# Patient Record
Sex: MALE | Race: WHITE | NOT HISPANIC OR LATINO | Employment: FULL TIME | URBAN - METROPOLITAN AREA
[De-identification: names, ages, dates, MRNs, and addresses within clinical notes are randomized per-mention and may not be internally consistent; named-entity substitution may affect disease eponyms.]

---

## 2024-09-11 ENCOUNTER — TRANSFERRED RECORDS (OUTPATIENT)
Dept: HEALTH INFORMATION MANAGEMENT | Facility: CLINIC | Age: 34
End: 2024-09-11

## 2024-09-17 ENCOUNTER — MEDICAL CORRESPONDENCE (OUTPATIENT)
Dept: HEALTH INFORMATION MANAGEMENT | Facility: CLINIC | Age: 34
End: 2024-09-17

## 2024-09-20 ENCOUNTER — TRANSCRIBE ORDERS (OUTPATIENT)
Dept: OTHER | Age: 34
End: 2024-09-20

## 2024-09-20 DIAGNOSIS — S89.91XA INJURY OF RIGHT KNEE: ICD-10-CM

## 2024-09-20 DIAGNOSIS — M23.91 INTERNAL DERANGEMENT OF KNEE, RIGHT: Primary | ICD-10-CM

## 2024-09-25 ENCOUNTER — TELEPHONE (OUTPATIENT)
Dept: ORTHOPEDICS | Facility: CLINIC | Age: 34
End: 2024-09-25
Payer: COMMERCIAL

## 2024-09-25 NOTE — TELEPHONE ENCOUNTER
Other: Pt is being referred to see a surgeon for multi ligament injury ACL,PCL,PLC. MRI and xray done @ [facility] in [place], [place]. Please review and advise     Could we send this information to you [facility] or would you prefer to receive a phone call?:   Patient would prefer a phone call   Okay to leave a detailed message?: Yes at Cell number on file:    Telephone Information:   Mobile [phone]

## 2024-09-25 NOTE — TELEPHONE ENCOUNTER
Action September 25, 2024 3:11 PM MT   Action Taken Sent a request for records from [facility].  Called [facility] for MRI to be pushed STAT, left a voicemail.  Called [facility] for imaging to be pushed STAT, left a voicemail.     Action September 27, 2024 3:26 PM MT   Action Taken Called [facility] radiology, tt: Con for imaging to be pushed     Action September 30, 2024 8:45 AM MT   Action Taken Called [facility] radiology, no answer, left a voicemail for their team again.      Action September 30, 2024 9:52 AM MT   Action Taken Called  [facility], tt: rep Emelina states that it shows completed on their end that the image was pushed, will call back if it is still not seen.     DIAGNOSIS:   Internal derangement of knee, right [M23.91]  - Primary  Injury of right knee [S89.91XA]   APPOINTMENT DATE: 09/30/2024   NOTES STATUS DETAILS   OFFICE NOTE from referring provider Care Everywhere Michael Moore MD - [facility]   OFFICE NOTE from other specialist Care Everywhere 09/06/2024 - Kay Boswell, APRN-CNP - [facility]   DISCHARGE REPORT from the ER Care Everywhere 09/05/2024  [facility]   MRI PACS [facility]:  09/11/2024 - RT Knee   XRAYS (IMAGES & REPORTS) In process [facility]:  09/05/2024 - RT Tib-Fib

## 2024-09-30 ENCOUNTER — ANCILLARY PROCEDURE (OUTPATIENT)
Dept: GENERAL RADIOLOGY | Facility: CLINIC | Age: 34
End: 2024-09-30
Attending: ORTHOPAEDIC SURGERY
Payer: OTHER MISCELLANEOUS

## 2024-09-30 ENCOUNTER — MEDICAL CORRESPONDENCE (OUTPATIENT)
Dept: HEALTH INFORMATION MANAGEMENT | Facility: CLINIC | Age: 34
End: 2024-09-30
Payer: COMMERCIAL

## 2024-09-30 ENCOUNTER — OFFICE VISIT (OUTPATIENT)
Dept: ORTHOPEDICS | Facility: CLINIC | Age: 34
End: 2024-09-30
Payer: OTHER MISCELLANEOUS

## 2024-09-30 ENCOUNTER — TELEPHONE (OUTPATIENT)
Dept: ORTHOPEDICS | Facility: CLINIC | Age: 34
End: 2024-09-30
Payer: COMMERCIAL

## 2024-09-30 ENCOUNTER — PRE VISIT (OUTPATIENT)
Dept: ORTHOPEDICS | Facility: CLINIC | Age: 34
End: 2024-09-30
Payer: COMMERCIAL

## 2024-09-30 VITALS — HEIGHT: 76 IN | BODY MASS INDEX: 25.57 KG/M2 | WEIGHT: 210 LBS

## 2024-09-30 DIAGNOSIS — M25.561 RIGHT KNEE PAIN: Primary | ICD-10-CM

## 2024-09-30 DIAGNOSIS — M25.561 ACUTE PAIN OF RIGHT KNEE: Primary | ICD-10-CM

## 2024-09-30 DIAGNOSIS — M25.561 RIGHT KNEE PAIN: ICD-10-CM

## 2024-09-30 PROCEDURE — 99204 OFFICE O/P NEW MOD 45 MIN: CPT | Mod: GC | Performed by: ORTHOPAEDIC SURGERY

## 2024-09-30 PROCEDURE — 73560 X-RAY EXAM OF KNEE 1 OR 2: CPT | Mod: RT | Performed by: RADIOLOGY

## 2024-09-30 NOTE — LETTER
9/30/2024      Tor Caballero  [address] [place] [address]      Dear Colleague,    Thank you for referring your patient, Tor Caballero, to the [facility]. Please see a copy of my visit note below.    CHIEF CONCERN: Right multi ligament knee injury     HISTORY:   This is a 33 year old male with a history of right upper extremity thoracic outlet syndrome and remote history of RUE DVT who presents for evaluation of his right knee. 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  States he has been in a knee immobilizer and using crutches for ambulation.  He reports diminished sensation over the dorsum of the right foot.  In regards to his right upper extremity he states he had a DVT related to thoracic outlet syndrome when he was in high school and was on period of anticoagulation for 3 to 4 months but has not been on anticoagulation since and has had no recurrent issues.  He is very physical at his job and it involves lifting, climbing and field work.    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 Articulate and Interactive    Body mass index is 25.56 kg/m .    Right lower extremity :  Skin is Warm and Well perfused, no suggestion of infection  Edematous around the right knee with mild effusion  Range of motion approximately 25 to 65 degrees  Exam limited by guarding and stiffness.  He does have increased translation in the sagittal plane.  Motor function 4 out of 5 with plantar and dorsiflexion, inversion and eversion.  He is lacking motor function to the EHL.    Decreased sensation in the deep and superficial peroneal nerve distributions  Palpable PT pulse    IMAGING:    Radiographs of the right knee from 9/5/2024 were independently reviewed by me and findings were discussed with the patient today. The imaging demonstrates maintained alignment.  Apparent fracture of the fibular head with retraction to the joint..    MRI of the right knee from 9/13/2024 were independently reviewed by me and findings were discussed with the patient today. The imaging demonstrates disruption of the ACL, PCL, posterolateral corner with fibular head fracture, likely radial tear of the lateral meniscus mid body and possible tearing of the anterior horn of the medial meniscus.    ASSESSMENT:  Right multiligament knee injury    PLAN:  We had a long discussion with Tor and his wife who was present today.  We recommended surgical intervention consisting of ACL reconstruction, PCL reconstruction, posterolateral corner reconstruction, possible medial and lateral meniscal repairs.  We discussed the expected postoperative course.  We did discuss that he has some altered function of his peroneal nerve.  We discussed that this would likely improve although this can be unpredictable and may not fully recover or potentially could even worsen after surgery.  All of their questions were answered and we will look at a time to schedule surgery. We will plan on post-operative DVT prophylaxis with aspirin.      Prince Rodriguez MD  Sports Medicine and Shoulder Fellow         Patient seen and examined with the fellow. I also personally reviewed the images and interpreted the imaging myself.     Assesment: Grade 3 rupture the anterior cruciate ligament, grade 3 rupture of the posterior cruciate ligament, grade 3 rupture of the posterior lateral corner with no EHL function and decreased tibialis anterior function    Plan: I had a long discussion with the patient.  Reviewed the diagnosis potential treatment options.  I told the patient this was a life-changing injury and his knee would never be the same as it was before.    Given his globally unstable knee he certainly needs surgical reconstruction.  The surgical plan will be examination under anesthesia right knee, right knee arthroscopy, anterior cruciate ligament reconstruction with quadricep tendon autograft, posterior cruciate ligament reconstruction with allograft, posterior lateral corner repair and reconstruction, peroneal nerve neurolysis, meniscus surgery.    I discussed with the patient the pros cons risk benefits of surgery versus not surgery specifically the 20% risk of arthrofibrosis.  We discussed the limitations of surgery need for further intervention.  Will look for time to schedule this can be completed.    I agree with history, physical and imaging as well as the assessment and plan as detailed by Dr. Rodriguez.       Again, thank you for allowing me to participate in the care of your patient.        Sincerely,        Jj Merritt MD

## 2024-09-30 NOTE — TELEPHONE ENCOUNTER
Procedure: ACL and PCL reconstruction. Posterior lateral corner reconstruction. Possible lateral and medical meniscus repair  Facility: [facility]  Length: 180-240 minutes  Anesthesia: Choice  Post-op appointments needed: 2 weeks provider only, 6 weeks with provider only.  Surgery packet/instructions given to patient?  Yes     Pre-Operative Teaching Flowsheet     Person(s) involved in teaching: Patient and Wife     Motivation Level:  Receptive (willing/able to accept information) and asks appropriate questions where applicable: Yes  Any cultural factors/Congregational beliefs that may influence understanding or compliance? No     Patient and Family demonstrates understanding of the following:  Pre-operative planning, including the necessary appointments and preparation needed prior to surgery: Yes  Which situations necessitate calling provider and whom to contact: Yes  Pain management techniques pre and post op: Yes  How, and when, to access community resources: Yes    Who will drive and stay/ with patient after surgery: Wife  Pre-op exam [facility]  PT to be completed at [facility]         Additional Teaching Concerns Addressed:   Post-operative living arrangements and necessary adaptations to living environment.     Instructional Materials Used/Given: Yes, pre-op packet given including forms for Your surgery day, preparing for surgery, showering before surgery, Stop light tool introduced, Opioid pain medication guideline, pre-op physical form, and map  Patient expressed understanding of all forms given, questions were answered and will review in more detail at home.     Time spent with patient: 20 minutes.

## 2024-09-30 NOTE — PROGRESS NOTES
Patient seen and examined with the fellow. I also personally reviewed the images and interpreted the imaging myself.     Assesment: Grade 3 rupture the anterior cruciate ligament, grade 3 rupture of the posterior cruciate ligament, grade 3 rupture of the posterior lateral corner with no EHL function and decreased tibialis anterior function    Plan: I had a long discussion with the patient.  Reviewed the diagnosis potential treatment options.  I told the patient this was a life-changing injury and his knee would never be the same as it was before.    Given his globally unstable knee he certainly needs surgical reconstruction.  The surgical plan will be examination under anesthesia right knee, right knee arthroscopy, anterior cruciate ligament reconstruction with quadricep tendon autograft, posterior cruciate ligament reconstruction with allograft, posterior lateral corner repair and reconstruction, peroneal nerve neurolysis, meniscus surgery.    I discussed with the patient the pros cons risk benefits of surgery versus not surgery specifically the 20% risk of arthrofibrosis.  We discussed the limitations of surgery need for further intervention.  Will look for time to schedule this can be completed.    I agree with history, physical and imaging as well as the assessment and plan as detailed by Dr. Rodriguez.

## 2024-09-30 NOTE — PROGRESS NOTES
CHIEF CONCERN: Right multi ligament knee injury     HISTORY:   This is a 33 year old male with a history of right upper extremity thoracic outlet syndrome and remote history of RUE DVT who presents for evaluation of his right knee. 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  States he has been in a knee immobilizer and using crutches for ambulation.  He reports diminished sensation over the dorsum of the right foot.  In regards to his right upper extremity he states he had a DVT related to thoracic outlet syndrome when he was in high school and was on period of anticoagulation for 3 to 4 months but has not been on anticoagulation since and has had no recurrent issues.  He is very physical at his job and it involves lifting, climbing and field work.    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 Articulate and Interactive    Body mass index is 25.56 kg/m .    Right lower extremity :  Skin is Warm and Well perfused, no suggestion of infection  Edematous around the right knee with mild effusion  Range of motion  approximately 25 to 65 degrees  Exam limited by guarding and stiffness.  He does have increased translation in the sagittal plane.  Motor function 4 out of 5 with plantar and dorsiflexion, inversion and eversion.  He is lacking motor function to the EHL.    Decreased sensation in the deep and superficial peroneal nerve distributions  Palpable PT pulse    IMAGING:    Radiographs of the right knee from 9/5/2024 were independently reviewed by me and findings were discussed with the patient today. The imaging demonstrates maintained alignment.  Apparent fracture of the fibular head with retraction to the joint..    MRI of the right knee from 9/13/2024 were independently reviewed by me and findings were discussed with the patient today. The imaging demonstrates disruption of the ACL, PCL, posterolateral corner with fibular head fracture, likely radial tear of the lateral meniscus mid body and possible tearing of the anterior horn of the medial meniscus.    ASSESSMENT:  Right multiligament knee injury    PLAN:  We had a long discussion with Tor and his wife who was present today.  We recommended surgical intervention consisting of ACL reconstruction, PCL reconstruction, posterolateral corner reconstruction, possible medial and lateral meniscal repairs.  We discussed the expected postoperative course.  We did discuss that he has some altered function of his peroneal nerve.  We discussed that this would likely improve although this can be unpredictable and may not fully recover or potentially could even worsen after surgery.  All of their questions were answered and we will look at a time to schedule surgery. We will plan on post-operative DVT prophylaxis with aspirin.     Prince Rodriguez MD  Sports Medicine and Shoulder Fellow

## 2024-10-02 NOTE — TELEPHONE ENCOUNTER
Returned patients call regarding scheduling surgery with Dr. Merritt. Let patient know at this time I am working with Dr. Merritt and OR team to confirm available dates for his surgery. Let patient know once I am able to offer a few dates, will reach back out to patient directly to schedule. Asked that patient reach back out in the mean time should he have additional questions or concerns. Patient understood and thanked writer.     Es Ignacio on 10/2/2024 at 4:25 PM     Surgery scheduled

## 2024-10-06 ENCOUNTER — HEALTH MAINTENANCE LETTER (OUTPATIENT)
Age: 34
End: 2024-10-06

## 2024-10-08 NOTE — TELEPHONE ENCOUNTER
Patient is scheduled for surgery with Dr. Merritt.     Spoke with: Patient     Date of Surgery: 10/24/24    Location: [facility] OR     Pre op with Provider: OFELIA     H&P: Patient will schedule pre op at [facility] in [place]. Informed patient pre op will need to be completed within 30 days of surgery date.     Additional imaging/appointments: Patient will schedule 1 week post op locally.   Patient is scheduled for a 6 week post op on 12/05/24 at 11:00.     Surgery packet: NA      Additional comments: Patient has been made aware of IV shortage. Let patient know I hope that will not impact his surgery, but we will let him know asap should surgery need to be postponed.         Es Ignacio on 10/8/2024 at 1:58 PM

## 2024-10-09 DIAGNOSIS — M25.561 ACUTE PAIN OF RIGHT KNEE: Primary | ICD-10-CM

## 2024-10-24 ENCOUNTER — APPOINTMENT (OUTPATIENT)
Dept: GENERAL RADIOLOGY | Facility: CLINIC | Age: 34
End: 2024-10-24
Attending: ORTHOPAEDIC SURGERY
Payer: OTHER MISCELLANEOUS

## 2024-10-24 ENCOUNTER — HOSPITAL ENCOUNTER (OUTPATIENT)
Facility: CLINIC | Age: 34
Discharge: HOME | End: 2024-10-24
Attending: ORTHOPAEDIC SURGERY | Admitting: ORTHOPAEDIC SURGERY
Payer: OTHER MISCELLANEOUS

## 2024-10-24 ENCOUNTER — ANESTHESIA (OUTPATIENT)
Dept: SURGERY | Facility: CLINIC | Age: 34
End: 2024-10-24
Payer: OTHER MISCELLANEOUS

## 2024-10-24 ENCOUNTER — ANESTHESIA EVENT (OUTPATIENT)
Dept: SURGERY | Facility: CLINIC | Age: 34
End: 2024-10-24
Payer: OTHER MISCELLANEOUS

## 2024-10-24 VITALS
WEIGHT: 211.42 LBS | BODY MASS INDEX: 25.75 KG/M2 | TEMPERATURE: 97.9 F | HEART RATE: 77 BPM | SYSTOLIC BLOOD PRESSURE: 149 MMHG | OXYGEN SATURATION: 100 % | DIASTOLIC BLOOD PRESSURE: 79 MMHG | RESPIRATION RATE: 12 BRPM | HEIGHT: 76 IN

## 2024-10-24 DIAGNOSIS — S83.511A RUPTURE OF ANTERIOR CRUCIATE LIGAMENT OF RIGHT KNEE, INITIAL ENCOUNTER: Primary | ICD-10-CM

## 2024-10-24 PROCEDURE — 710N000012 HC RECOVERY PHASE 2, PER MINUTE: Performed by: ORTHOPAEDIC SURGERY

## 2024-10-24 PROCEDURE — 360N000084 HC SURGERY LEVEL 4 W/ FLUORO, PER MIN: Performed by: ORTHOPAEDIC SURGERY

## 2024-10-24 PROCEDURE — 27385 REPAIR OF THIGH MUSCLE: CPT | Mod: 22 | Performed by: ORTHOPAEDIC SURGERY

## 2024-10-24 PROCEDURE — 64447 NJX AA&/STRD FEMORAL NRV IMG: CPT | Mod: 59 | Performed by: STUDENT IN AN ORGANIZED HEALTH CARE EDUCATION/TRAINING PROGRAM

## 2024-10-24 PROCEDURE — 250N000011 HC RX IP 250 OP 636: Performed by: STUDENT IN AN ORGANIZED HEALTH CARE EDUCATION/TRAINING PROGRAM

## 2024-10-24 PROCEDURE — 710N000010 HC RECOVERY PHASE 1, LEVEL 2, PER MIN: Performed by: ORTHOPAEDIC SURGERY

## 2024-10-24 PROCEDURE — 29889 ARTHRS AID PCL RPR/AGMNTJ: CPT | Mod: XS | Performed by: ORTHOPAEDIC SURGERY

## 2024-10-24 PROCEDURE — 999N000180 XR SURGERY CARM FLUORO LESS THAN 5 MIN: Mod: TC

## 2024-10-24 PROCEDURE — 250N000013 HC RX MED GY IP 250 OP 250 PS 637

## 2024-10-24 PROCEDURE — 29888 ARTHRS AID ACL RPR/AGMNTJ: CPT | Mod: XS | Performed by: ORTHOPAEDIC SURGERY

## 2024-10-24 PROCEDURE — 272N000002 HC OR SUPPLY OTHER OPNP: Performed by: ORTHOPAEDIC SURGERY

## 2024-10-24 PROCEDURE — 250N000011 HC RX IP 250 OP 636: Performed by: NURSE ANESTHETIST, CERTIFIED REGISTERED

## 2024-10-24 PROCEDURE — 29881 ARTHRS KNE SRG MNISECTMY M/L: CPT | Mod: 22 | Performed by: ORTHOPAEDIC SURGERY

## 2024-10-24 PROCEDURE — 370N000017 HC ANESTHESIA TECHNICAL FEE, PER MIN: Performed by: ORTHOPAEDIC SURGERY

## 2024-10-24 PROCEDURE — 250N000009 HC RX 250: Performed by: NURSE ANESTHETIST, CERTIFIED REGISTERED

## 2024-10-24 PROCEDURE — 250N000009 HC RX 250: Mod: JZ | Performed by: STUDENT IN AN ORGANIZED HEALTH CARE EDUCATION/TRAINING PROGRAM

## 2024-10-24 PROCEDURE — C1713 ANCHOR/SCREW BN/BN,TIS/BN: HCPCS | Performed by: ORTHOPAEDIC SURGERY

## 2024-10-24 PROCEDURE — C1762 CONN TISS, HUMAN(INC FASCIA): HCPCS | Performed by: ORTHOPAEDIC SURGERY

## 2024-10-24 PROCEDURE — 250N000025 HC SEVOFLURANE, PER MIN: Performed by: ORTHOPAEDIC SURGERY

## 2024-10-24 PROCEDURE — 64708 REVISE ARM/LEG NERVE: CPT | Mod: 22 | Performed by: ORTHOPAEDIC SURGERY

## 2024-10-24 PROCEDURE — 29889 ARTHRS AID PCL RPR/AGMNTJ: CPT | Performed by: STUDENT IN AN ORGANIZED HEALTH CARE EDUCATION/TRAINING PROGRAM

## 2024-10-24 PROCEDURE — 999N000141 HC STATISTIC PRE-PROCEDURE NURSING ASSESSMENT: Performed by: ORTHOPAEDIC SURGERY

## 2024-10-24 PROCEDURE — 29889 ARTHRS AID PCL RPR/AGMNTJ: CPT | Performed by: NURSE ANESTHETIST, CERTIFIED REGISTERED

## 2024-10-24 PROCEDURE — 272N000001 HC OR GENERAL SUPPLY STERILE: Performed by: ORTHOPAEDIC SURGERY

## 2024-10-24 PROCEDURE — 64447 NJX AA&/STRD FEMORAL NRV IMG: CPT | Mod: XU,RT | Performed by: NURSE ANESTHETIST, CERTIFIED REGISTERED

## 2024-10-24 PROCEDURE — 250N000011 HC RX IP 250 OP 636

## 2024-10-24 PROCEDURE — 258N000003 HC RX IP 258 OP 636: Performed by: NURSE ANESTHETIST, CERTIFIED REGISTERED

## 2024-10-24 PROCEDURE — 250N000009 HC RX 250: Performed by: ORTHOPAEDIC SURGERY

## 2024-10-24 PROCEDURE — 250N000013 HC RX MED GY IP 250 OP 250 PS 637: Performed by: STUDENT IN AN ORGANIZED HEALTH CARE EDUCATION/TRAINING PROGRAM

## 2024-10-24 PROCEDURE — 27427 RECONSTRUCTION KNEE: CPT | Mod: 22 | Performed by: ORTHOPAEDIC SURGERY

## 2024-10-24 PROCEDURE — 27405 REPAIR OF KNEE LIGAMENT: CPT | Mod: 22 | Performed by: ORTHOPAEDIC SURGERY

## 2024-10-24 DEVICE — IMPLANTABLE DEVICE: Type: IMPLANTABLE DEVICE | Site: KNEE | Status: FUNCTIONAL

## 2024-10-24 DEVICE — QUADLINK IMPLANT SYSTEM, 10MM
Type: IMPLANTABLE DEVICE | Site: KNEE | Status: FUNCTIONAL
Brand: ARTHREX®

## 2024-10-24 DEVICE — Ø7X 30MM BC IF SCRW, VENTED
Type: IMPLANTABLE DEVICE | Site: KNEE | Status: FUNCTIONAL
Brand: ARTHREX®

## 2024-10-24 DEVICE — BIO-COMP SWIVELOCK C, CLD 5.5X19.1MM
Type: IMPLANTABLE DEVICE | Site: KNEE | Status: FUNCTIONAL
Brand: ARTHREX®

## 2024-10-24 DEVICE — TIGHTROPE® II ABS, IMPLANT
Type: IMPLANTABLE DEVICE | Site: KNEE | Status: FUNCTIONAL
Brand: ARTHREX®

## 2024-10-24 DEVICE — Ø6X 20MM BC IF SCRW, VENTED
Type: IMPLANTABLE DEVICE | Site: KNEE | Status: FUNCTIONAL
Brand: ARTHREX®

## 2024-10-24 DEVICE — GRAFT TENDON SEMITENDINOSUS 26CM 430355: Type: IMPLANTABLE DEVICE | Site: KNEE | Status: FUNCTIONAL

## 2024-10-24 RX ORDER — DEXAMETHASONE SODIUM PHOSPHATE 4 MG/ML
4 INJECTION, SOLUTION INTRA-ARTICULAR; INTRALESIONAL; INTRAMUSCULAR; INTRAVENOUS; SOFT TISSUE
Status: DISCONTINUED | OUTPATIENT
Start: 2024-10-24 | End: 2024-10-24 | Stop reason: HOSPADM

## 2024-10-24 RX ORDER — HALOPERIDOL 5 MG/ML
1 INJECTION INTRAMUSCULAR
Status: DISCONTINUED | OUTPATIENT
Start: 2024-10-24 | End: 2024-10-24 | Stop reason: HOSPADM

## 2024-10-24 RX ORDER — FENTANYL CITRATE 50 UG/ML
25-50 INJECTION, SOLUTION INTRAMUSCULAR; INTRAVENOUS
Status: DISCONTINUED | OUTPATIENT
Start: 2024-10-24 | End: 2024-10-24 | Stop reason: HOSPADM

## 2024-10-24 RX ORDER — LIDOCAINE 40 MG/G
CREAM TOPICAL
Status: DISCONTINUED | OUTPATIENT
Start: 2024-10-24 | End: 2024-10-24 | Stop reason: HOSPADM

## 2024-10-24 RX ORDER — ONDANSETRON 4 MG/1
4 TABLET, ORALLY DISINTEGRATING ORAL EVERY 30 MIN PRN
Status: DISCONTINUED | OUTPATIENT
Start: 2024-10-24 | End: 2024-10-24 | Stop reason: HOSPADM

## 2024-10-24 RX ORDER — FLUMAZENIL 0.1 MG/ML
0.2 INJECTION, SOLUTION INTRAVENOUS
Status: DISCONTINUED | OUTPATIENT
Start: 2024-10-24 | End: 2024-10-24 | Stop reason: HOSPADM

## 2024-10-24 RX ORDER — MIDAZOLAM HYDROCHLORIDE 2 MG/ML
20 SYRUP ORAL ONCE
Status: DISCONTINUED | OUTPATIENT
Start: 2024-10-24 | End: 2024-10-24

## 2024-10-24 RX ORDER — DEXMEDETOMIDINE HYDROCHLORIDE 4 UG/ML
INJECTION, SOLUTION INTRAVENOUS
Status: COMPLETED | OUTPATIENT
Start: 2024-10-24 | End: 2024-10-24

## 2024-10-24 RX ORDER — CEFAZOLIN SODIUM/WATER 2 G/20 ML
2 SYRINGE (ML) INTRAVENOUS
Status: DISCONTINUED | OUTPATIENT
Start: 2024-10-24 | End: 2024-10-24 | Stop reason: HOSPADM

## 2024-10-24 RX ORDER — PROPOFOL 10 MG/ML
INJECTION, EMULSION INTRAVENOUS PRN
Status: DISCONTINUED | OUTPATIENT
Start: 2024-10-24 | End: 2024-10-24

## 2024-10-24 RX ORDER — ONDANSETRON 2 MG/ML
4 INJECTION INTRAMUSCULAR; INTRAVENOUS EVERY 30 MIN PRN
Status: DISCONTINUED | OUTPATIENT
Start: 2024-10-24 | End: 2024-10-24 | Stop reason: HOSPADM

## 2024-10-24 RX ORDER — HYDROMORPHONE HYDROCHLORIDE 1 MG/ML
0.2 INJECTION, SOLUTION INTRAMUSCULAR; INTRAVENOUS; SUBCUTANEOUS EVERY 5 MIN PRN
Status: DISCONTINUED | OUTPATIENT
Start: 2024-10-24 | End: 2024-10-24 | Stop reason: HOSPADM

## 2024-10-24 RX ORDER — NALOXONE HYDROCHLORIDE 0.4 MG/ML
0.4 INJECTION, SOLUTION INTRAMUSCULAR; INTRAVENOUS; SUBCUTANEOUS
Status: DISCONTINUED | OUTPATIENT
Start: 2024-10-24 | End: 2024-10-24 | Stop reason: HOSPADM

## 2024-10-24 RX ORDER — FENTANYL CITRATE 50 UG/ML
50 INJECTION, SOLUTION INTRAMUSCULAR; INTRAVENOUS EVERY 5 MIN PRN
Status: DISCONTINUED | OUTPATIENT
Start: 2024-10-24 | End: 2024-10-24 | Stop reason: HOSPADM

## 2024-10-24 RX ORDER — ASPIRIN 81 MG/1
81 TABLET ORAL 2 TIMES DAILY
Qty: 60 TABLET | Refills: 0 | Status: SHIPPED | OUTPATIENT
Start: 2024-10-24

## 2024-10-24 RX ORDER — OXYCODONE HYDROCHLORIDE 5 MG/1
5 TABLET ORAL
Status: COMPLETED | OUTPATIENT
Start: 2024-10-24 | End: 2024-10-24

## 2024-10-24 RX ORDER — BUPIVACAINE HYDROCHLORIDE AND EPINEPHRINE 2.5; 5 MG/ML; UG/ML
INJECTION, SOLUTION INFILTRATION; PERINEURAL
Status: COMPLETED | OUTPATIENT
Start: 2024-10-24 | End: 2024-10-24

## 2024-10-24 RX ORDER — DEXAMETHASONE SODIUM PHOSPHATE 10 MG/ML
INJECTION, SOLUTION INTRAMUSCULAR; INTRAVENOUS
Status: COMPLETED | OUTPATIENT
Start: 2024-10-24 | End: 2024-10-24

## 2024-10-24 RX ORDER — NALOXONE HYDROCHLORIDE 0.4 MG/ML
0.2 INJECTION, SOLUTION INTRAMUSCULAR; INTRAVENOUS; SUBCUTANEOUS
Status: DISCONTINUED | OUTPATIENT
Start: 2024-10-24 | End: 2024-10-24 | Stop reason: HOSPADM

## 2024-10-24 RX ORDER — ACETAMINOPHEN 325 MG/1
975 TABLET ORAL ONCE
Status: COMPLETED | OUTPATIENT
Start: 2024-10-24 | End: 2024-10-24

## 2024-10-24 RX ORDER — LIDOCAINE HYDROCHLORIDE 20 MG/ML
INJECTION, SOLUTION INFILTRATION; PERINEURAL PRN
Status: DISCONTINUED | OUTPATIENT
Start: 2024-10-24 | End: 2024-10-24

## 2024-10-24 RX ORDER — HYDROXYZINE HYDROCHLORIDE 25 MG/1
25 TABLET, FILM COATED ORAL
Status: DISCONTINUED | OUTPATIENT
Start: 2024-10-24 | End: 2024-10-24 | Stop reason: HOSPADM

## 2024-10-24 RX ORDER — OXYCODONE HYDROCHLORIDE 5 MG/1
5-10 TABLET ORAL EVERY 4 HOURS PRN
Qty: 30 TABLET | Refills: 0 | Status: SHIPPED | OUTPATIENT
Start: 2024-10-24

## 2024-10-24 RX ORDER — CEFAZOLIN SODIUM/WATER 2 G/20 ML
2 SYRINGE (ML) INTRAVENOUS SEE ADMIN INSTRUCTIONS
Status: DISCONTINUED | OUTPATIENT
Start: 2024-10-24 | End: 2024-10-24 | Stop reason: HOSPADM

## 2024-10-24 RX ORDER — ONDANSETRON 4 MG/1
4 TABLET, ORALLY DISINTEGRATING ORAL EVERY 8 HOURS PRN
Qty: 4 TABLET | Refills: 0 | Status: SHIPPED | OUTPATIENT
Start: 2024-10-24

## 2024-10-24 RX ORDER — NALOXONE HYDROCHLORIDE 0.4 MG/ML
0.1 INJECTION, SOLUTION INTRAMUSCULAR; INTRAVENOUS; SUBCUTANEOUS
Status: DISCONTINUED | OUTPATIENT
Start: 2024-10-24 | End: 2024-10-24 | Stop reason: HOSPADM

## 2024-10-24 RX ORDER — KETAMINE HYDROCHLORIDE 10 MG/ML
INJECTION INTRAMUSCULAR; INTRAVENOUS PRN
Status: DISCONTINUED | OUTPATIENT
Start: 2024-10-24 | End: 2024-10-24

## 2024-10-24 RX ORDER — ONDANSETRON 4 MG/1
4 TABLET, ORALLY DISINTEGRATING ORAL
Status: DISCONTINUED | OUTPATIENT
Start: 2024-10-24 | End: 2024-10-24 | Stop reason: HOSPADM

## 2024-10-24 RX ORDER — SODIUM CHLORIDE, SODIUM LACTATE, POTASSIUM CHLORIDE, CALCIUM CHLORIDE 600; 310; 30; 20 MG/100ML; MG/100ML; MG/100ML; MG/100ML
INJECTION, SOLUTION INTRAVENOUS CONTINUOUS PRN
Status: DISCONTINUED | OUTPATIENT
Start: 2024-10-24 | End: 2024-10-24

## 2024-10-24 RX ORDER — ACETAMINOPHEN 325 MG/1
650 TABLET ORAL EVERY 4 HOURS PRN
Qty: 50 TABLET | Refills: 0 | Status: SHIPPED | OUTPATIENT
Start: 2024-10-24

## 2024-10-24 RX ORDER — SODIUM CHLORIDE, SODIUM LACTATE, POTASSIUM CHLORIDE, CALCIUM CHLORIDE 600; 310; 30; 20 MG/100ML; MG/100ML; MG/100ML; MG/100ML
INJECTION, SOLUTION INTRAVENOUS CONTINUOUS
Status: DISCONTINUED | OUTPATIENT
Start: 2024-10-24 | End: 2024-10-24 | Stop reason: HOSPADM

## 2024-10-24 RX ORDER — ACETAMINOPHEN 325 MG/1
650 TABLET ORAL
Status: DISCONTINUED | OUTPATIENT
Start: 2024-10-24 | End: 2024-10-24 | Stop reason: HOSPADM

## 2024-10-24 RX ORDER — BUPIVACAINE HYDROCHLORIDE AND EPINEPHRINE 2.5; 5 MG/ML; UG/ML
INJECTION, SOLUTION INFILTRATION; PERINEURAL PRN
Status: DISCONTINUED | OUTPATIENT
Start: 2024-10-24 | End: 2024-10-24 | Stop reason: HOSPADM

## 2024-10-24 RX ORDER — HYDROXYZINE HYDROCHLORIDE 25 MG/1
25 TABLET, FILM COATED ORAL 3 TIMES DAILY PRN
Qty: 20 TABLET | Refills: 0 | Status: SHIPPED | OUTPATIENT
Start: 2024-10-24

## 2024-10-24 RX ORDER — HYDROMORPHONE HYDROCHLORIDE 1 MG/ML
0.4 INJECTION, SOLUTION INTRAMUSCULAR; INTRAVENOUS; SUBCUTANEOUS EVERY 5 MIN PRN
Status: DISCONTINUED | OUTPATIENT
Start: 2024-10-24 | End: 2024-10-24 | Stop reason: HOSPADM

## 2024-10-24 RX ORDER — ACETAMINOPHEN 325 MG/1
975 TABLET ORAL ONCE
Status: DISCONTINUED | OUTPATIENT
Start: 2024-10-24 | End: 2024-10-24 | Stop reason: HOSPADM

## 2024-10-24 RX ORDER — ONDANSETRON 2 MG/ML
INJECTION INTRAMUSCULAR; INTRAVENOUS PRN
Status: DISCONTINUED | OUTPATIENT
Start: 2024-10-24 | End: 2024-10-24

## 2024-10-24 RX ORDER — GABAPENTIN 100 MG/1
300 CAPSULE ORAL
Status: DISCONTINUED | OUTPATIENT
Start: 2024-10-24 | End: 2024-10-24 | Stop reason: HOSPADM

## 2024-10-24 RX ORDER — FENTANYL CITRATE 50 UG/ML
25 INJECTION, SOLUTION INTRAMUSCULAR; INTRAVENOUS EVERY 5 MIN PRN
Status: DISCONTINUED | OUTPATIENT
Start: 2024-10-24 | End: 2024-10-24 | Stop reason: HOSPADM

## 2024-10-24 RX ORDER — DEXAMETHASONE SODIUM PHOSPHATE 4 MG/ML
INJECTION, SOLUTION INTRA-ARTICULAR; INTRALESIONAL; INTRAMUSCULAR; INTRAVENOUS; SOFT TISSUE PRN
Status: DISCONTINUED | OUTPATIENT
Start: 2024-10-24 | End: 2024-10-24

## 2024-10-24 RX ORDER — ASCORBIC ACID 250 MG
250 TABLET,CHEWABLE ORAL DAILY
COMMUNITY

## 2024-10-24 RX ORDER — METHOCARBAMOL 750 MG/1
750 TABLET, FILM COATED ORAL ONCE
Status: COMPLETED | OUTPATIENT
Start: 2024-10-24 | End: 2024-10-24

## 2024-10-24 RX ORDER — AMOXICILLIN 250 MG
1-2 CAPSULE ORAL 2 TIMES DAILY
Qty: 30 TABLET | Refills: 0 | Status: SHIPPED | OUTPATIENT
Start: 2024-10-24

## 2024-10-24 RX ADMIN — HYDROMORPHONE HYDROCHLORIDE 0.5 MG: 1 INJECTION, SOLUTION INTRAMUSCULAR; INTRAVENOUS; SUBCUTANEOUS at 08:24

## 2024-10-24 RX ADMIN — PROPOFOL 200 MG: 10 INJECTION, EMULSION INTRAVENOUS at 07:45

## 2024-10-24 RX ADMIN — OXYCODONE HYDROCHLORIDE 5 MG: 5 TABLET ORAL at 14:34

## 2024-10-24 RX ADMIN — HYDROMORPHONE HYDROCHLORIDE 0.5 MG: 1 INJECTION, SOLUTION INTRAMUSCULAR; INTRAVENOUS; SUBCUTANEOUS at 10:10

## 2024-10-24 RX ADMIN — DEXMEDETOMIDINE HYDROCHLORIDE 20 MCG: 100 INJECTION, SOLUTION INTRAVENOUS at 07:20

## 2024-10-24 RX ADMIN — DEXMEDETOMIDINE HYDROCHLORIDE 8 MCG: 100 INJECTION, SOLUTION INTRAVENOUS at 11:30

## 2024-10-24 RX ADMIN — SODIUM CHLORIDE, POTASSIUM CHLORIDE, SODIUM LACTATE AND CALCIUM CHLORIDE: 600; 310; 30; 20 INJECTION, SOLUTION INTRAVENOUS at 07:39

## 2024-10-24 RX ADMIN — DEXAMETHASONE SODIUM PHOSPHATE 8 MG: 4 INJECTION, SOLUTION INTRAMUSCULAR; INTRAVENOUS at 07:56

## 2024-10-24 RX ADMIN — MIDAZOLAM 2 MG: 1 INJECTION INTRAMUSCULAR; INTRAVENOUS at 07:26

## 2024-10-24 RX ADMIN — Medication 2 G: at 11:45

## 2024-10-24 RX ADMIN — HYDROMORPHONE HYDROCHLORIDE 0.5 MG: 1 INJECTION, SOLUTION INTRAMUSCULAR; INTRAVENOUS; SUBCUTANEOUS at 13:08

## 2024-10-24 RX ADMIN — FENTANYL CITRATE 50 MCG: 50 INJECTION INTRAMUSCULAR; INTRAVENOUS at 07:26

## 2024-10-24 RX ADMIN — FENTANYL CITRATE 100 MCG: 50 INJECTION INTRAMUSCULAR; INTRAVENOUS at 07:45

## 2024-10-24 RX ADMIN — Medication 10 MG: at 08:24

## 2024-10-24 RX ADMIN — DEXMEDETOMIDINE HYDROCHLORIDE 8 MCG: 100 INJECTION, SOLUTION INTRAVENOUS at 10:37

## 2024-10-24 RX ADMIN — METHOCARBAMOL 750 MG: 750 TABLET ORAL at 15:49

## 2024-10-24 RX ADMIN — DEXMEDETOMIDINE HYDROCHLORIDE 12 MCG: 100 INJECTION, SOLUTION INTRAVENOUS at 13:02

## 2024-10-24 RX ADMIN — ONDANSETRON 4 MG: 2 INJECTION INTRAMUSCULAR; INTRAVENOUS at 12:53

## 2024-10-24 RX ADMIN — SODIUM CHLORIDE, POTASSIUM CHLORIDE, SODIUM LACTATE AND CALCIUM CHLORIDE: 600; 310; 30; 20 INJECTION, SOLUTION INTRAVENOUS at 11:04

## 2024-10-24 RX ADMIN — Medication 2 G: at 07:45

## 2024-10-24 RX ADMIN — DEXMEDETOMIDINE HYDROCHLORIDE 8 MCG: 100 INJECTION, SOLUTION INTRAVENOUS at 13:01

## 2024-10-24 RX ADMIN — Medication 40 MG: at 07:45

## 2024-10-24 RX ADMIN — ACETAMINOPHEN 975 MG: 325 TABLET ORAL at 14:06

## 2024-10-24 RX ADMIN — DEXMEDETOMIDINE HYDROCHLORIDE 4 MCG: 100 INJECTION, SOLUTION INTRAVENOUS at 12:58

## 2024-10-24 RX ADMIN — BUPIVACAINE HYDROCHLORIDE AND EPINEPHRINE BITARTRATE 20 ML: 2.5; .005 INJECTION, SOLUTION INFILTRATION; PERINEURAL at 07:20

## 2024-10-24 RX ADMIN — HYDROMORPHONE HYDROCHLORIDE 0.5 MG: 1 INJECTION, SOLUTION INTRAMUSCULAR; INTRAVENOUS; SUBCUTANEOUS at 10:37

## 2024-10-24 RX ADMIN — DEXAMETHASONE SODIUM PHOSPHATE 1 MG: 10 INJECTION, SOLUTION INTRAMUSCULAR; INTRAVENOUS at 07:20

## 2024-10-24 RX ADMIN — LIDOCAINE HYDROCHLORIDE 100 MG: 20 INJECTION, SOLUTION INFILTRATION; PERINEURAL at 07:45

## 2024-10-24 RX ADMIN — FENTANYL CITRATE 50 MCG: 50 INJECTION INTRAMUSCULAR; INTRAVENOUS at 13:44

## 2024-10-24 ASSESSMENT — ACTIVITIES OF DAILY LIVING (ADL)
ADLS_ACUITY_SCORE: 0

## 2024-10-24 ASSESSMENT — LIFESTYLE VARIABLES: TOBACCO_USE: 0

## 2024-10-24 NOTE — BRIEF OP NOTE
[facility] Brief Operative Note    Pre-operative diagnosis: Injury of right knee [S89.91XA]   Post-operative diagnosis * No post-op diagnosis entered *  Same as preop   Procedure: Procedure(s):  Posterior Cruciate Ligament Reconstruction with Allograft  Repair and Reconstruction with Allograft of Lateral Ligaments, Peroneal Nerve Neurolysis  Examination Under Anesthesia, Knee Arthroscopy, Anterior Cruciate Ligament Reconstruction with Quadricepts Tendon Autograft, Partial Meniscus Surgery   Surgeon(s): Surgeons and Role:     * Jj Merritt MD - Primary     * Yudith Clark PA-C - Assisting     * Mariel Hager MD - Resident - Assisting   Estimated blood loss: 100 mL    Specimens: * No specimens in log *   Findings: See full op report.      Activity: Up with assist and assistive devices. Brace at all times x 1 week. Locked at 10 deg of flexion.  Weight bearing status: TTWB LLE  Antibiotics: Cefazolin completed intra-op.   Diet: Begin with clear fluids and progress diet as tolerated.   DVT prophylaxis:  Mechanical and ASA 81 mg BID x 6 weeks to start POD1.   Elevation: Elevate heels off of bed on pillows   Wound Care: Tegaderm and alginate x 2 weeks. Okay to remove and replace Tubigrip as needed for edema control.  Drains: none  Pain management: Orals PRN. Ibuprofen okay  X-rays: completed intraop  Follow Up: 2 weeks with Dr. Thorne's team     Disposition: Pending pain control. Likely to discharge to home today     Orthopedic Surgery staff for this patient is Dr. Merritt.    Mariel Hager MD, PGY-5

## 2024-10-24 NOTE — ANESTHESIA CARE TRANSFER NOTE
Patient: Tor Caballero    Procedure: Procedure(s):  Posterior Cruciate Ligament Reconstruction with Allograft  Repair and Reconstruction with Allograft of Lateral Ligaments, Peroneal Nerve Neurolysis  Examination Under Anesthesia, Knee Arthroscopy, Anterior Cruciate Ligament Reconstruction with Quadricepts Tendon Autograft, Partial Meniscus Surgery       Diagnosis: Injury of right knee [S89.91XA]  Diagnosis Additional Information: No value filed.    Anesthesia Type:   General     Note:    Oropharynx: oropharynx clear of all foreign objects  Level of Consciousness: awake  Oxygen Supplementation: face mask  Level of Supplemental Oxygen (L/min / FiO2): 6  Independent Airway: airway patency satisfactory and stable  Dentition: dentition unchanged  Vital Signs Stable: post-procedure vital signs reviewed and stable  Report to RN Given: handoff report given  Patient transferred to: PACU    Handoff Report: Identifed the Patient, Identified the Reponsible Provider, Reviewed the pertinent medical history, Discussed the surgical course, Reviewed Intra-OP anesthesia mangement and issues during anesthesia, Set expectations for post-procedure period and Allowed opportunity for questions and acknowledgement of understanding  Vitals:  Vitals Value Taken Time   /55 10/24/24 1315   Temp     Pulse 72 10/24/24 1317   Resp 17 10/24/24 1317   SpO2 99 % 10/24/24 1317   Vitals shown include unfiled device data.    Electronically Signed By: JAYJAY Dong CRNA  October 24, 2024  1:17 PM

## 2024-10-24 NOTE — OP NOTE
PREOPERATIVE DIAGNOSIS:   Multiligament knee injury right knee  Grade 3 rupture of the right anterior cruciate ligament  Grade 3 rupture of the right posterior cruciate ligament  Rupture of the right posterior lateral corner including fibular collateral ligament, popliteus tendon, popliteal fibular ligament  Peroneal nerve dysfunction since the time of the accident with 0 out of 5 EHL and 3 out of 5 tibialis anterior  Biceps femoris avulsion  Arthrofibrosis with range of motion 5 to 100 degrees    POSTOPERATIVE DIAGNOSIS:  Multiligament knee injury right knee  Grade 3 rupture of the right anterior cruciate ligament  Grade 3 rupture of the right posterior cruciate ligament  Rupture of the right posterior lateral corner including fibular collateral ligament, popliteus tendon, popliteal fibular ligament  Intact peroneal nerve.  Peroneal nerve dysfunction since time of accident with 0-5 EHL and 3 out of 5 tibialis anterior.  Biceps femoris avulsion  Lateral meniscus tear  Arthrofibrosis with range of motion 2 to 135 degrees    PROCEDURE:  Examination under anesthesia right knee  Right knee arthroscopy  Partial lateral meniscectomy  ACL reconstruction quad tendon autograft  ACL reconstruction with allograft  Posterior lateral corner reconstruction (anatomic) with allograft  Peroneal nerve neurolysis  Biceps femoris repair  Fibular collateral ligament repair  Manipulation under anesthesia and arthroscopic lysis of adhesions    MODIFIER: I am requesting a 22 modifier for this procedure given the significant zone of injury around the posterior lateral corner which doubled if not tripled certain portions of the procedure such as the peroneal nerve neurolysis and posterior lateral corner reconstruction.    DATE OF SURGERY: 10/24/2024    SURGEON: Jj Merritt MD    ASSISTANT: Yudith Clark PA-C. The assistance of Mrs. Clark was necessary for positioning, arthroscopic visualization, retraction, graft preparation and graft  passage. There was no qualified available resident or fellow who was aware of the intricies of the procedure and requirements of graft preparation.    RESIDENT OR FELLOW: Mariel Hager MD    OPERATIVE INDICATIONS: Tor Caballero is a pleasant 33 year old who I saw through my orthopedic clinic with a history, physical, imaging consistent with a severe multiligament knee injury with complete rupture of the anterior cruciate ligament, posterior cruciate ligament and posterior lateral corner.  The biceps femoris was also avulsed.  Significant dysfunction of the peroneal nerve was noted since the time of the accident with 3 out of 5 tibialis anterior and 0-5 EHL function.    I discussed with the patient that this was a life-changing injury that it would never be the same as it was before though he did require surgical reconstruction given the gross instability of his knee..  I reviewed with the patient the risks, benefits, complications, techniques and alternatives to surgery.  We reviewed the expected course of recovery and the potential expected outcomes.  The patient understood both the risks and benefits and desired to proceed despite the risks.    OPERATIVE DETAILS: In the preoperative area the patient's informed consent was reviewed and they desired to proceed.  The right leg was marked and the patient was in agreement.  The patient was taken to the operating room where a timeout was performed and all parties were in agreement.  Preoperative antibiotics were given within 1 hour of the time of incision.  The patient was placed in the supine position and surrendered to LMA anesthesia.  No tourniquet was applied.  Egg crate was placed beneath the well leg and a side post was utilized.  The operative leg was prepped and draped in the usual sterile fashion.     Examination Under Anesthesia:    Range of motion was from 5 to 95 degrees.  Stable to valgus stress testing at 0 and 30 degrees.  2B Lachman, 1+ pivot shift, 3B  posterior drawer, 3+ opening to varus at 30 degrees, 2+ at 0 degrees.    At this time a 4 cm midline incision was made over the quadricep tendon carried out the skin and subcutaneous tissues.  Meticulous hemostasis was ensured.  We quickly identified the quadricep tendon and we sized this at 35 mm in width.  A 10 mm sizing guide was used.  It was placed onto the superior pole the patella.  And a knife was used to begin to elevate flaps.  Running locking fiber stitch was placed and the 10 mm harvesting guide was utilized.  Once we confirmed that we had 75 mm of graft it was transected.  We then closed the donor site defect with 1 Vicryl suture in an interrupted fashion.    Graft preparation:  The graft was taken to the back table where fiber stitch quad link was affixed to both sides.  The femoral side measured 9 mm the tibial side measured 9.5 mm.  The total graft dimensions was 72 mm in length.  It was tensioned at 20 pounds for 20 minutes under antibiotic saline.    At this time a posterior tibialis tendon was thawed and open graft link technology was employed.  The graft was quadrupled.  Measured size 11.5 tibial side by 11.5 on the femoral side.  Running locking fiber loop was placed on each tail the graft was quadrupled over the cortical buttons.  Circumferential sutures were placed at 1 and 2 cm from each end and it was tensioned at 20 pounds for 20 minutes to remove any creep from within the graft.    To semitendinosis allograft were also open.  This measured 6.5 mm in diameter and 5 mm in diameter.  The 6.5 mm graft was for the fibular collateral ligament whereas the 5 mm graft was for the popliteus tendon.  Running locking fiber loop was placed on each tail and it was also tensioned 20 pounds for 20 minutes to remove any creep from within the graft.  Placed on antibiotics daily.    Anterior medial and anterolateral scope portals are created and a diagnostic arthroscopy was performed with the following  findings: There are no loose bodies in the suprapatellar pouch, medial gutter, lateral gutter.  Medial femoral condyle, medial tibial plateau, medial meniscus was normal in appearance.  No instability of the medial meniscus.  Complete obliteration of the contents of the intercondylar notch with complete disruption the intra cruciate ligament posterior cruciate ligament.  Positive drive-through sign of the lateral compartment lateral tibial plateau, lateral femoral condyle was intact.  Lateral meniscus showed a radial tear of the mid body extending approximately 50% through the midportion of the lateral meniscus.  Patella cartilage was intact in the medial patella facet lateral patella facet as well as the central ridge.  Trochlear cartilage appeared normal.  Significant scar tissue was noted throughout the suprapatellar pouch, medial gutter and lateral gutter.    This time a shaver and a thermal device was introduced and the scar tissue was debrided.  Then our manipulation under anesthesia was performed which allowed motion from 2 to 140 degrees.  At this time a biter was introduced and a partial lateral meniscectomy was complete until about stable rim of meniscal tissue remained.    We then began working on the contents of the intercondylar notch.  Thermal device and a shaver was introduced and a debridement of the intercondylar notch was performed until we could visualize the anatomic insertion of the ACL tibial tunnel, PCL femoral tunnel, ACL femoral tunnel and then we began to work on the posterior aspect of the knee until we could visualize the anatomic insertion of the PCL tibial tunnel.  We had to create a posterior medial portal to do this and utilized the 70 degree arthroscope.  Our PCL guide was then introduced.  Osseous length measured 65 mm.  A flip cutter was placed.  We confirmed its position under fluoroscopy.  And a 40 mm socket by 11.5 mm was reamed.  Passing suture was placed.  We then drilled a 9  x 20 mm ACL femoral tunnel using the 6-9 guide with the arthroscope in the medial portal.  We selected a position for the ACL femoral tunnel and this was reamed without complication.  ACL tibial tunnel was then reamed with the arthroscope in the lateral portal a tip to tip guide was used.  Care was taken to not coalesce the ACL and PCL tibial tunnels.  Osseous length measured 40 mm.  9.5 mm socket reamed.  Finally using an outside in guide we reamed the femoral tunnel for the PCL osseous length measured 25 mm and we reamed a 20 mm x 11.5 mm socket.  Passing sutures were placed.  We confirmed there are no soft tissue bridges and they were not wrapped upon themselves.    At this time we turned our attention to the posterior lateral corner a 15 cm apex anterior incision was made was carried down through the skin subcutaneous tissues.  The IT band was identified.  As we continued a dissection posteriorly we saw a massive zone of injury along the area of the biceps with complete avulsion of the biceps femoris and a significant amount of damaged tissue from the zone of injury.  The fibular head was identified at this this was largely free from tissue in the posterior lateral aspect.  The biceps femoris was then identified.  Working very carefully we began to work through the scar tissue in the posterior border the biceps femoris and the peroneal nerve was identified.  Vesseloops were placed around this and was protected during the remainder.  This is exceedingly difficult and tripled this standard portion of the procedure given the massive amount of scar tissue from the zone of injury.  We continued our dissection proximally decompressing and neural lysing the peroneal nerve as it was well deep to the lateral biceps femoris.  We then continued the dissection distally around the fibular head and opened up the anterior compartment we can visualize the peroneal nerve diving into the anterior compartment.  At this time the  nerve was protected with 2 Vesseloops it was moved out of the way this allowed exposure to the fibular head.  Where a 2.4 mm drill tip pin was placed in a lateral to medial direction across the fibular head.  It was reamed with a 6 mm reamer.  Passing suture was placed.  We then opened the IT band longitudinally and found the avulsed fibular collateral ligament.  2 Beath pins were placed and they were angled proximal anterior to avoid coalescent of the fibular tunnel.    At this time we returned to the arthroscopic visualization and confirmed that we had not coalesced the posterior lateral corner tunnels with the ACL femoral tunnel.  We then reamed these to a depth of 30 mm.  Passing sutures were placed.    While we were in arthroscopically then we passed our PCL graft through the medial portal down to the tibia and the cortical button was deployed over the femoral cortex.  20 mm of graft was reduced into the femur.  Our ACL graft was then brought into the medial portal and reduced into the femur 20 mm.  The remaining tail of that graft was then reduced into the tibia.  At this time provisional fixation was performed for the PCL with the knee under a perfect lateral radiograph tensioning along the tibial side against the ABS button was then completed with the knee at 90 degrees in neutral rotation and we confirmed on C-arm that we had not over reduce the tibia anterior to the femur.  We then brought the knee to full extension and final tensioning of her ACL was performed against the ABS button.  Tensioning and retention was then performed and the ACL and tibial side arthroscopic images showed excellent tension of the graft clearance along the roughly intercondylar notch and lateral wall.  At this time with the knee back at 90 degrees tensioning and retention of the PCL graft was performed in both the femoral and the tibial side against the now intact ACL graft.  Final knot-tying was completed all sutures removed for  the cruciates.    We then returned to the lateral side where our fibular collateral ligament graft was reduced into the femoral tunnel and it was fixed with a 7 x 30 mm biocomposite screw.  Our popliteus graft was fixed with a 6 x 20 mm biocomposite screw.  This was placed deep to the FCL graft and routed deep below the lateral structures and routed in a lateral to medial direction through the fibular head.  The remaining popliteus graft was just docked in the posterior aspect of her knee.  At this time running locking fiber loop was placed on the biceps femoris as well as on the avulsed fibular collateral ligament.  And this was incorporated into the fibular tunnel completing our repair of the biceps femoris and fibular collateral ligament.  With the knee in maximum valgus, neutral rotation and 30 degrees of flexion a 6 x 20 mm biocomposite screw was placed completing the fibular collateral ligament reconstruction.    At this time under fluoroscopic control a 2.4 mm drill tip pin was placed in the anterior to posterior direction across the proximal lateral tibial plateau with care taken to not penetrate too far posteriorly.  We then reamed this with a 7 mm reamer and both tails of our posterior lateral corner graft were routed in a posterior to anterior direction across the tibia.  A 7 x 30 mm biocomposite screw was then placed the knee at neutral rotation and approximately 60 degrees of flexion.    Examination at this time showed absolutely no opening to varus stress testing and negative external rotation drawer testing.  A free needle was then used to suture the biceps femoris and fibular collateral ligament to itself completed the repair of the lateral side.  Copious irrigation was performed an a layered closure was initiated, sterile dressings were applied and the patient was transferred to the recovery room in stable condition with stable vital signs.    ESTIMATED BLOOD LOSS: 100 mL.    TOURNIQUET TIME: No  tourniquet was placed.    COMPLICATIONS: None apparent.    DRAINS: None.    SPECIMENS: None.     POSTOPERATIVE PLAN:  Patient be allowed to discharge home today  Toe-touch weightbearing right lower extremity x 6 weeks  Hinged knee brace.  No motion for 1 week.  At 1 week range of motion 0 to 90 degrees when sitting or doing therapy  Okay for range of motion with physical therapy ordering a home therapy program within the first week otherwise brace on and locked  No active hamstring until 8 weeks  Shower on day 3 no submerging the wounds for 2 weeks  Aspirin 162 mg daily x 4 weeks  At 6 weeks begin weightbearing as tolerated and range of motion as tolerated.  Wean from crutches and brace when able  No running until at least 3 months though may be closer functional until 4 months  Patient will have an increased risk of arthrofibrosis given the significant scar tissue that was encountered in the massive zone of injury.  Will need to watch this closely in the postoperative period and consider manipulation under anesthesia/lysis of adhesions at 12 weeks if arthrofibrosis occurs      Addendum: The patient was evaluated in the PACU and found to have dorsiflexion of his ankle at baseline.  He reported that his sensation and mobility of his toe was improved.

## 2024-10-24 NOTE — ANESTHESIA PROCEDURE NOTES
"Adductor canal Procedure Note    Pre-Procedure   Staff -        Anesthesiologist:  Angel Lei MD       Resident/Fellow: Asim Zavala MD       Performed By: resident       Location: pre-op       Pre-Anesthestic Checklist: patient identified, IV checked, site marked, risks and benefits discussed, informed consent, monitors and equipment checked, pre-op evaluation, at physician/surgeon's request and post-op pain management  Timeout:       Correct Patient: Yes        Correct Procedure: Yes        Correct Site: Yes        Correct Position: Yes        Correct Laterality: Yes        Site Marked: Yes  Procedure Documentation  Procedure: Adductor canal       Diagnosis: POSTOPERATIVE PAIN MANAGEMENT       Laterality: right       Patient Position: sitting       Skin prep: Chloraprep       Needle Type: insulated       Needle Gauge: 21.        Needle Length (millimeters): 110        Ultrasound guided       1. Ultrasound was used to identify targeted nerve, plexus, vascular marker, or fascial plane and place a needle adjacent to it in real-time.       2. Ultrasound was used to visualize the spread of anesthetic in close proximity to the above referenced structure.    Assessment/Narrative         The placement was negative for: blood aspirated, painful injection and site bleeding       Paresthesias: No.       Bolus given via needle..        Secured via.        Insertion/Infusion Method: Single Shot       Complications: none    Medication(s) Administered   Bupivacaine 0.25% w/ 1:200K Epi (Injection) - Injection   20 mL - 10/24/2024 7:20:00 AM  Dexmedetomidine 4 mcg/mL (Perineural) - Perineural   20 mcg - 10/24/2024 7:20:00 AM  Dexamethasone 10 mg/mL PF (Perineural) - Perineural   1 mg - 10/24/2024 7:20:00 AM    FOR [facility] ONLY:   Pain Team Contact information: please page the Pain Team Via [place]. Search \"Pain\". During daytime hours, please page the attending first. At night please page " the resident first.

## 2024-10-24 NOTE — ANESTHESIA PROCEDURE NOTES
Airway       Patient location during procedure: OR       Procedure Start/Stop Times: 10/24/2024 7:52 AM  Staff -        CRNA: Donna Cummins APRN CRNA       Performed By: CRNA  Consent for Airway        Urgency: elective  Indications and Patient Condition       Indications for airway management: ara-procedural       Induction type:intravenous       Mask difficulty assessment: 0 - not attempted    Final Airway Details       Final airway type: supraglottic airway    Supraglottic Airway Details        Type: LMA       Brand: Air-Q       LMA size: 5    Post intubation assessment        Placement verified by: capnometry, equal breath sounds and chest rise        Number of attempts at approach: 1       Number of other approaches attempted: 0       Secured with: tape       Ease of procedure: easy       Dentition: Intact and Unchanged    Medication(s) Administered   Medication Administration Time: 10/24/2024 7:52 AM

## 2024-10-24 NOTE — ANESTHESIA POSTPROCEDURE EVALUATION
Patient: Tor Caballero    Procedure: Procedure(s):  Posterior Cruciate Ligament Reconstruction with Allograft  Repair and Reconstruction with Allograft of Lateral Ligaments, Peroneal Nerve Neurolysis  Examination Under Anesthesia, Knee Arthroscopy, Anterior Cruciate Ligament Reconstruction with Quadricepts Tendon Autograft, Partial Meniscus Surgery       Anesthesia Type:  General    Note:  Disposition: Outpatient   Postop Pain Control: Uneventful            Sign Out: Well controlled pain   PONV: No   Neuro/Psych: Uneventful            Sign Out: Acceptable/Baseline neuro status   Airway/Respiratory: Uneventful            Sign Out: Acceptable/Baseline resp. status   CV/Hemodynamics: Uneventful            Sign Out: Acceptable CV status; No obvious hypovolemia; No obvious fluid overload   Other NRE: NONE   DID A NON-ROUTINE EVENT OCCUR? No           Last vitals:  Vitals Value Taken Time   /78 10/24/24 1434   Temp 36.9  C (98.4  F) 10/24/24 1430   Pulse 71 10/24/24 1441   Resp 0 10/24/24 1441   SpO2 97 % 10/24/24 1441   Vitals shown include unfiled device data.    Electronically Signed By: Destiny Montejo MD  October 24, 2024  4:53 PM

## 2024-10-24 NOTE — DISCHARGE INSTRUCTIONS
To contact a doctor, call Dr. Merritt, [facility]: [phone]  or:     [phone] and ask for the Resident On Call for:          Orthopedics (answered 24 hours a day)   Emergency Departments:  [facility] Department: [phone]

## 2024-10-24 NOTE — ANESTHESIA PREPROCEDURE EVALUATION
"Anesthesia Pre-Procedure Evaluation    Patient: Tor Caballero   MRN: [id number] : 1990        Procedure : Procedure(s):  Posterior Cruciate Ligament Reconstruction with Allograft  Repair and Reconstruction with Allograft of Lateral Ligaments, Peroneal Nerve Neurolysis  Examination Under Anesthesia, Knee Arthroscopy, Anterior Cruciate Ligament Reconstruction with Quadricepts Tendon Autograft, Meniscus Surgery          History reviewed. No pertinent past medical history.   Past Surgical History:   Procedure Laterality Date    HERNIA REPAIR        No Known Allergies   Social History     Tobacco Use    Smoking status: Never    Smokeless tobacco: Never   Substance Use Topics    Alcohol use: Not on file      Wt Readings from Last 1 Encounters:   10/24/24 95.9 kg (211 lb 6.7 oz)        Anesthesia Evaluation            ROS/MED HX  ENT/Pulmonary:    (-) tobacco use   Neurologic:  - neg neurologic ROS     Cardiovascular:  - neg cardiovascular ROS     METS/Exercise Tolerance: >4 METS    Hematologic:  - neg hematologic  ROS     Musculoskeletal: Comment: Posterior cruciate ligament injury, right knee      GI/Hepatic:  - neg GI/hepatic ROS     Renal/Genitourinary:  - neg Renal ROS     Endo:  - neg endo ROS     Psychiatric/Substance Use:  - neg psychiatric ROS     Infectious Disease:  - neg infectious disease ROS     Malignancy:  - neg malignancy ROS     Other:  - neg other ROS          Physical Exam    Airway  airway exam normal      Mallampati: I   TM distance: > 3 FB   Neck ROM: full   Mouth opening: > 3 cm    Respiratory Devices and Support         Dental       (+) Completely normal teeth      Cardiovascular   cardiovascular exam normal          Pulmonary   pulmonary exam normal                OUTSIDE LABS:  CBC: No results found for: \"WBC\", \"HGB\", \"HCT\", \"PLT\"  BMP: No results found for: \"NA\", \"POTASSIUM\", \"CHLORIDE\", \"CO2\", \"BUN\", \"CR\", \"GLC\"  COAGS: No results found for: \"PTT\", \"INR\", \"FIBR\"  POC: No results found " "for: \"BGM\", \"HCG\", \"HCGS\"  HEPATIC: No results found for: \"ALBUMIN\", \"PROTTOTAL\", \"ALT\", \"AST\", \"GGT\", \"ALKPHOS\", \"BILITOTAL\", \"BILIDIRECT\", \"GUSTAVO\"  OTHER: No results found for: \"PH\", \"LACT\", \"A1C\", \"OPAL\", \"PHOS\", \"MAG\", \"LIPASE\", \"AMYLASE\", \"TSH\", \"T4\", \"T3\", \"CRP\", \"SED\"    Anesthesia Plan    ASA Status:  1    NPO Status:  NPO Appropriate    Anesthesia Type: General.              Consents    Anesthesia Plan(s) and associated risks, benefits, and realistic alternatives discussed. Questions answered and patient/representative(s) expressed understanding.     - Discussed:     - Discussed with:  Patient      - Extended Intubation/Ventilatory Support Discussed: No.      - Patient is DNR/DNI Status: No     Use of blood products discussed: No .     Postoperative Care    Pain management: Oral pain medications, IV analgesics, Multi-modal analgesia.   PONV prophylaxis: Ondansetron (or other 5HT-3), Dexamethasone or Solumedrol     Comments:               Destiny Montejo MD    I have reviewed the pertinent notes and labs in the chart from the past 30 days and (re)examined the patient.  Any updates or changes from those notes are reflected in this note.                       # Overweight: Estimated body mass index is 25.74 kg/m  as calculated from the following:    Height as of this encounter: 1.93 m (6' 4\").    Weight as of this encounter: 95.9 kg (211 lb 6.7 oz).             "

## 2024-10-25 ENCOUNTER — DOCUMENTATION ONLY (OUTPATIENT)
Dept: ORTHOPEDICS | Facility: CLINIC | Age: 34
End: 2024-10-25
Payer: COMMERCIAL

## 2024-10-25 NOTE — PROGRESS NOTES
Op note faxed to patients PT at [facility] at [phone]. Op note faxed to [facility] at [phone] for local post op appointment.     LEONARDO Grijalva

## 2024-10-28 ENCOUNTER — TELEPHONE (OUTPATIENT)
Dept: OTHER | Facility: CLINIC | Age: 34
End: 2024-10-28
Payer: COMMERCIAL

## 2024-10-28 DIAGNOSIS — M25.561 CHRONIC PAIN OF RIGHT KNEE: ICD-10-CM

## 2024-10-28 DIAGNOSIS — M25.561 ACUTE PAIN OF RIGHT KNEE: Primary | ICD-10-CM

## 2024-10-28 DIAGNOSIS — G89.29 CHRONIC PAIN OF RIGHT KNEE: ICD-10-CM

## 2024-10-28 RX ORDER — OXYCODONE HYDROCHLORIDE 5 MG/1
5 TABLET ORAL EVERY 4 HOURS PRN
Qty: 20 TABLET | Refills: 0 | Status: SHIPPED | OUTPATIENT
Start: 2024-10-28 | End: 2024-10-31

## 2024-10-28 NOTE — TELEPHONE ENCOUNTER
Refills have been requested for the following medications:         oxyCODONE (ROXICODONE) 5 MG tablet [DIONNA UREÑA]       Patient Comment: I've been taking up to 1.5 tablets every 4 hours and I tried to start weening over night. I have enough pain this morning I think I might need some more to get through weening. I currently have enough to get through today (Monday)     Message received from patient. Refill pended to provider.  Eli Coronel RN

## 2024-10-28 NOTE — TELEPHONE ENCOUNTER
Orthopedic Surgery Telephone Note 10/28/2024    Tor Caballero is a 33 year old male who is s/p examination under anesthesia of right knee, right knee arthroscopy, partial lateral meniscectomy, ACL reconstruction quad tendon autograft, ACL reconstruction with allograft, posterior lateral corner reconstruction (anatomic) with allograft, peroneal nerve neurolysis, biceps femoris repair, fibular collateral ligament repair, manipulation under anesthesia and arthroscopic lysis of adhesions with Dr. Merritt on 10/24/24.     Patient's wife, Louis, contacted the orthopedic after-hours phone line as patient has developed fevers, nausea, vomiting starting this morning.  She reports that their family has all had similar symptoms but his seem particularly severe.  Has been taking postoperative medications including Tylenol which has not seemed to improve his fever significantly.  He denies diarrhea, states that has been able to tolerate fluids more recently without vomiting.  Denies significant increase in operative knee pain, operative leg pain beyond pain that was present since initial postoperative day 1. Denies increase in drainage from surgical sites. While he reports right lower extremity pain, he denies increased swelling, color change, significant leg pain different than after postoperative day 1 that would be suspicious for DVT.  Maintains hinged knee brace, extension of lower extremity. Patient did not contact his primary care provider about fever, nausea, vomiting given that his worsening symptoms occurred after the clinic is closed for the day.    Discussed with patient use of Tylenol for fever, antinausea medications that they state they have in-house, fluids, and rest. Recommended contacting primary care provider tomorrow 10/29/2024 if symptoms persist.  Anticipatory guidance given regarding worsening symptoms, increased knee pain and leg pain, advised to present to [facility] ER if symptoms severe, severely  worsening leg pain, intractable nausea/vomiting, ongoing elevated fevers that do not continue to improve.  Patient's wife and patient (heard in the background) expressed understanding.    Please refer to previous Orthopedic Surgery note for additional plan of care.     Nolvia Paez MD   Orthopedic Surgery PGY-1   Pager: [phone]  October 28, 2024, 5:55 PM    Please page me directly with any questions/concerns during regular weekday hours before 5pm. If there is no response, if it is a weekend, or if it is during evening hours, then please page the orthopedic surgery resident on call.

## 2024-11-06 ENCOUNTER — TELEPHONE (OUTPATIENT)
Dept: OTHER | Facility: CLINIC | Age: 34
End: 2024-11-06
Payer: COMMERCIAL

## 2024-11-06 DIAGNOSIS — Z98.890 STATUS POST ARTHROSCOPIC SURGERY OF RIGHT KNEE: Primary | ICD-10-CM

## 2024-11-06 NOTE — TELEPHONE ENCOUNTER
Orthopedic Surgery Telephone Note 11/06/2024    Tor Caballero is a 33 year old male who is s/p examination under anesthesia of right knee, right knee arthroscopy, partial lateral meniscectomy, ACL reconstruction quad tendon autograft, ACL reconstruction with allograft, posterior lateral corner reconstruction (anatomic) with allograft, peroneal nerve neurolysis, biceps femoris repair, fibular collateral ligament repair, manipulation under anesthesia and arthroscopic lysis of adhesions with Dr. Merritt on 10/24/24.     Patient contacted the after-hours phone line as he has developed aching, cramping pain on his right leg from the inner aspect of his thigh to knee to lower leg to ankle, reports this pain is worst at inner aspect of knee.   He denies trauma or twisting injury to the knee. Reports he completed PT exercises at home earlier today without issue, iced as he does after each PT session, and then had pain beginning to worsen since 4:30 pm that led him to sit down, elevate leg, continue icing, take pain medication, and contact the after-hours line. He reports taking oxycodone approximately 45 minutes prior to calling and does not feel this has helped yet, but that oxycodone usually takes a hour to start working for him. Additionally takes extra-strength tylenol q6 hours, last taken at 1 pm, and continues to take ASA 81 mg BID.     He denies increased swelling in knee or leg. Has been wearing his knee brace consistently. Reports his stitches were taken out at local appointment without issue yesterday, 11/5. Denies swelling of the knee or drainage, bleeding, or breakdown at surgical sites. Has been TTWB with crutches without increased pain with TTWB since pain began. Denies color change or swelling in leg, neurovascular changes.     Additionally denies ongoing GI distress, fever, chills, other symptoms. Reports his voice, throat pain has continued to improve after initial post-operative hoarseness and difficulty  "swallowing, although \"not yet 100%\".     He reports he lives further away from [facility] and so has been following up closer to home for care. He has a PT appointment coming up but no other Orthopedic appointments until his 6 week appointment on 12/5/24.     Reports improvement in pain when contacted approximately 30 minutes after initial call. States he took a picture of his knee and will send via [employer] for reference.    Plan:  - Recommended ongoing use of current medications as scheduled, adding Ibuprofen as well as muscle relaxant for improved pain control and given description of muscle cramping   - Recommended ongoing elevation, ice   - Low concern for DVT but anticipatory guidance given regarding symptoms of DVT and need for evaluation if this were to occur, presentation to [facility] ER   - Recommended contacting clinic with update in the AM, consideration of need for in-person evaluation    Patient expressed understanding of plan as above.     Nolvia Paez MD   Orthopedic Surgery PGY-1   Pager: [phone]  November 6, 2024, 5:22 PM   "

## 2024-11-07 ENCOUNTER — TELEPHONE (OUTPATIENT)
Dept: ORTHOPEDICS | Facility: CLINIC | Age: 34
End: 2024-11-07
Payer: COMMERCIAL

## 2024-11-07 NOTE — TELEPHONE ENCOUNTER
Other: Tor called he was having leg cramps yesterday but they seem better today and no discoloration      Could we send this information to you in [employer] or would you prefer to receive a phone call?:   Patient would prefer a phone call   Okay to leave a detailed message?: Yes at Home number on file [phone] (home)

## 2024-11-11 NOTE — TELEPHONE ENCOUNTER
Called and talked with patient to discuss his muscle cramp. He states he has not had any more since 11/6 around 8-9pm. He talked with on-call provider and took Tylenol, ibuprofen and oxy and muscle relaxer to stop the cramp.   His leg has no swelling, discoloration or continued pain. He is doing well now. No other questions on concerns.  Eli Coronel RN

## 2024-12-05 ENCOUNTER — ANCILLARY PROCEDURE (OUTPATIENT)
Dept: GENERAL RADIOLOGY | Facility: CLINIC | Age: 34
End: 2024-12-05
Attending: ORTHOPAEDIC SURGERY
Payer: OTHER MISCELLANEOUS

## 2024-12-05 DIAGNOSIS — Z98.890 STATUS POST ARTHROSCOPIC SURGERY OF RIGHT KNEE: ICD-10-CM

## 2025-01-16 ENCOUNTER — VIRTUAL VISIT (OUTPATIENT)
Dept: ORTHOPEDICS | Facility: CLINIC | Age: 35
End: 2025-01-16
Attending: ORTHOPAEDIC SURGERY
Payer: COMMERCIAL

## 2025-01-16 DIAGNOSIS — M25.562 CHRONIC PAIN OF LEFT KNEE: Primary | ICD-10-CM

## 2025-01-16 DIAGNOSIS — G89.29 CHRONIC PAIN OF LEFT KNEE: Primary | ICD-10-CM

## 2025-01-16 NOTE — LETTER
1/16/2025      Tor Caballero  [address] [place] [address]      Dear Colleague,    Thank you for referring your patient, Tor Caballero, to the [facility]. Please see a copy of my visit note below.    Patient seen for a video visit given long travel distance      DIAGNOSIS:   Multiligament knee injury right knee  Grade 3 rupture of the anterior cruciate ligament  Grade 3 rupture of the posterior cruciate ligament  Grade 3 rupture of the posterior lateral corner  Peroneal nerve dysfunction    PROCEDURES:  Anterior cruciate ligament, posterior cruciate ligament, lateral collateral ligament, peroneal nerve neurolysis and partial lateral meniscectomy; date of surgery 10/24/2024    HISTORY:  Doing well 11 weeks following the above for his multiligament knee reconstruction.  Pain control.  Returned to work.  Still notes some ongoing Itself of his EHL does not notice any deficits of his tibialis anterior.  Knee feels stable to him.  Discussed his case with physical therapy who stated that his motion was from approximately 5 to 125 degrees    EXAM:     General: Awake, Alert, and oriented. Articulates and communicates with a normal affect     Left lower Extremity:  Incisions well healed without evidence of infection by report  No post-operative effusion or ecchymosis by report  Range of motion by report is from 5-1 25    IMAGING:  No new imaging    ASSESSMENT:  11 weeks status post multiligament knee reconstruction    PLAN:   Weightbearing: WBAT  Range of Motion: No range of motion restrictions.   Acitivity Restrictions:  May do straight line running at this time  No running distance or pace restrictions  No cutting, pivoting, or start-stop running  Goal to build strength, endurance, and confidence to allow sports in 3 months time (6 months from the date of surgery)  He is cleared to work without restrictions though he should use commonsense and take a rest as necessary  Follow up: 1  year anniversary of surgery with AP and lateral radiographs.  This can be done via video visit.  I would like him to get new radiographs of his knee approximately 1 week out as well as a functional test from a physical therapist approximately 1 week before    Again, thank you for allowing me to participate in the care of your patient.        Sincerely,        Jj Merritt MD    Electronically signed

## 2025-01-16 NOTE — PROGRESS NOTES
Patient seen for a video visit given long travel distance      DIAGNOSIS:   Multiligament knee injury right knee  Grade 3 rupture of the anterior cruciate ligament  Grade 3 rupture of the posterior cruciate ligament  Grade 3 rupture of the posterior lateral corner  Peroneal nerve dysfunction    PROCEDURES:  Anterior cruciate ligament, posterior cruciate ligament, lateral collateral ligament, peroneal nerve neurolysis and partial lateral meniscectomy; date of surgery 10/24/2024    HISTORY:  Doing well 11 weeks following the above for his multiligament knee reconstruction.  Pain control.  Returned to work.  Still notes some ongoing Itself of his EHL does not notice any deficits of his tibialis anterior.  Knee feels stable to him.  Discussed his case with physical therapy who stated that his motion was from approximately 5 to 125 degrees    EXAM:     General: Awake, Alert, and oriented. Articulates and communicates with a normal affect     Left lower Extremity:  Incisions well healed without evidence of infection by report  No post-operative effusion or ecchymosis by report  Range of motion by report is from 5-1 25    IMAGING:  No new imaging    ASSESSMENT:  11 weeks status post multiligament knee reconstruction    PLAN:   Weightbearing: WBAT  Range of Motion: No range of motion restrictions.   Acitivity Restrictions:  May do straight line running at this time  No running distance or pace restrictions  No cutting, pivoting, or start-stop running  Goal to build strength, endurance, and confidence to allow sports in 3 months time (6 months from the date of surgery)  He is cleared to work without restrictions though he should use commonsense and take a rest as necessary  Follow up: 1 year anniversary of surgery with AP and lateral radiographs.  This can be done via video visit.  I would like him to get new radiographs of his knee approximately 1 week out as well as a functional test from a physical therapist approximately  1 week before

## 2025-01-16 NOTE — LETTER
January 16, 2025      Tor Caballero  [address] [place] [address]        To Whom It May Concern:    Tor Caballero was seen in our clinic. He may return to work with the following: no restrictions on activity but please allow him the ability to self assess his readiness to take on new or more demanding tasks gradually. He may return with these guidelines on or about 1/20/25.      Sincerely,      Jj Merritt MD    Electronically signed

## 2025-01-16 NOTE — NURSING NOTE
Reason For Visit: No chief complaint on file.      ?  No  Occupation [employer].  Currently working? No.  Work status?  On medical leave.  Date of injury: 9/5/24  Type of injury: Fall.  Date of surgery: 10/24/24  Type of surgery: PCL, ACL, peroneal neurolysis, menisectomy.      Sane Score  Right  knee - Affected  Left Knee- 100  Right Knee- 75      Rony Blackburn, ATC

## (undated) DEVICE — TUBING ARTHROSCOPY PUMP ARTHREX AR-6410

## (undated) DEVICE — Device

## (undated) DEVICE — ESU GROUND PAD ADULT W/CORD E7507

## (undated) DEVICE — DRSG STERI STRIP 1/2X4" R1547

## (undated) DEVICE — DECANTER TRANSFER DEVICE 2008S

## (undated) DEVICE — PREP CHLORAPREP 26ML TINTED HI-LITE ORANGE 930815

## (undated) DEVICE — DRSG TEGADERM 4X4 3/4" 1626W

## (undated) DEVICE — GLOVE BIOGEL PI MICRO INDICATOR UNDERGLOVE SZ 8.0 48980

## (undated) DEVICE — SOL WATER IRRIG 1000ML BOTTLE 2F7114

## (undated) DEVICE — DRAPE C-ARM W/STRAPS 42X72" 07-CA104

## (undated) DEVICE — SU VICRYL 2-0 CT-1 27" UND J259H

## (undated) DEVICE — SU LOOP #2 FIBERLOOP  AR-7234

## (undated) DEVICE — SUTURE VICRYL+ 2-0 CT-2 27" UND VCP269H

## (undated) DEVICE — MARKING PEN REG/FINE DUALT TIP WITH RULER 1437SR-100

## (undated) DEVICE — SOL NACL 0.9% IRRIG 3000ML BAG 2B7477

## (undated) DEVICE — DRSG TEGADERM 4X10" 1627

## (undated) DEVICE — SUCTION MANIFOLD NEPTUNE 2 SYS 4 PORT 0702-020-000

## (undated) DEVICE — POSITIONER ARMBOARD FOAM 1PAIR LF FP-ARMB1

## (undated) DEVICE — SU NDL MAYO 1824-4

## (undated) DEVICE — PIN GUIDE ARTHREX 2.4MM W/EYE BEATH PIN AR-1297L

## (undated) DEVICE — COVER CAMERA IN-LIGHT DISP LT-C02

## (undated) DEVICE — LABEL MEDICATION SYSTEM 3303-P

## (undated) DEVICE — SPONGE RAY-TEC 4X8" 7318

## (undated) DEVICE — VESSEL LOOPS BLUE MINI

## (undated) DEVICE — ARTHROSCOPIC CANNULA TWIST-IN PURPLE 7MMX7CM AR-6570

## (undated) DEVICE — ABLATOR ARTHREX APOLLO RF MP90 ASPIRATING 90DEG AR-9811

## (undated) DEVICE — BLADE KNIFE SURG 10 371110

## (undated) DEVICE — TUBING SUCTION MEDI-VAC 1/4"X20' N620A

## (undated) DEVICE — SU ETHILON 3-0 PS-1 18" 1663H

## (undated) DEVICE — SU VICRYL 0 CT-1 27" UND J260H

## (undated) DEVICE — GLOVE BIOGEL PI SZ 8.0 40880

## (undated) DEVICE — SU ETHIBOND 1 CT-1 30" X425H

## (undated) DEVICE — BLADE KNIFE SURG 15 371115

## (undated) DEVICE — LINEN TOWEL PACK X5 5464

## (undated) DEVICE — BUR ARTHREX COOLCUT EXCALIBUR 4.0MMX13CM AR-8400EX

## (undated) DEVICE — DRAPE TIBURON TOP SHEET 100X60" 29352

## (undated) DEVICE — SPONGE LAP 18X18" X8435

## (undated) DEVICE — SU MONOCRYL 3-0 PS-1 27" Y936H

## (undated) DEVICE — SU FIBERWIRE 2 38"  AR-7200

## (undated) DEVICE — PACK ACL SUPPLEMENT STD

## (undated) DEVICE — STRAP KNEE/BODY 31143004

## (undated) DEVICE — PIN GUIDE ARTHREX 2.4MM DRILL  AR-1250L

## (undated) DEVICE — BUR ARTHREX COOLCUT DISSECTOR 3.5MM  AR-8350DS

## (undated) DEVICE — GOWN IMPERVIOUS SPECIALTY XLG/XLONG 32474

## (undated) DEVICE — LINEN ORTHO PACK 5446

## (undated) DEVICE — DRSG TEGADERM ALGINATE AG 4X5" 90303

## (undated) DEVICE — BNDG SPANDAGRIP SZ J LF BEIGE 6.75" SAG13117

## (undated) RX ORDER — FENTANYL CITRATE 50 UG/ML
INJECTION, SOLUTION INTRAMUSCULAR; INTRAVENOUS
Status: DISPENSED
Start: 2024-10-24

## (undated) RX ORDER — DEXAMETHASONE SODIUM PHOSPHATE 4 MG/ML
INJECTION, SOLUTION INTRA-ARTICULAR; INTRALESIONAL; INTRAMUSCULAR; INTRAVENOUS; SOFT TISSUE
Status: DISPENSED
Start: 2024-10-24

## (undated) RX ORDER — HYDROMORPHONE HYDROCHLORIDE 1 MG/ML
INJECTION, SOLUTION INTRAMUSCULAR; INTRAVENOUS; SUBCUTANEOUS
Status: DISPENSED
Start: 2024-10-24

## (undated) RX ORDER — CEFAZOLIN SODIUM 1 G/3ML
INJECTION, POWDER, FOR SOLUTION INTRAMUSCULAR; INTRAVENOUS
Status: DISPENSED
Start: 2024-10-24

## (undated) RX ORDER — CEFAZOLIN SODIUM/WATER 2 G/20 ML
SYRINGE (ML) INTRAVENOUS
Status: DISPENSED
Start: 2024-10-24

## (undated) RX ORDER — OXYCODONE HYDROCHLORIDE 5 MG/1
TABLET ORAL
Status: DISPENSED
Start: 2024-10-24

## (undated) RX ORDER — ACETAMINOPHEN 325 MG/1
TABLET ORAL
Status: DISPENSED
Start: 2024-10-24

## (undated) RX ORDER — VANCOMYCIN HYDROCHLORIDE 1 G/20ML
INJECTION, POWDER, LYOPHILIZED, FOR SOLUTION INTRAVENOUS
Status: DISPENSED
Start: 2024-10-24

## (undated) RX ORDER — PROPOFOL 10 MG/ML
INJECTION, EMULSION INTRAVENOUS
Status: DISPENSED
Start: 2024-10-24

## (undated) RX ORDER — ONDANSETRON 2 MG/ML
INJECTION INTRAMUSCULAR; INTRAVENOUS
Status: DISPENSED
Start: 2024-10-24

## (undated) RX ORDER — METHOCARBAMOL 750 MG/1
TABLET, FILM COATED ORAL
Status: DISPENSED
Start: 2024-10-24